# Patient Record
Sex: MALE | Race: WHITE | Employment: UNEMPLOYED | URBAN - NONMETROPOLITAN AREA
[De-identification: names, ages, dates, MRNs, and addresses within clinical notes are randomized per-mention and may not be internally consistent; named-entity substitution may affect disease eponyms.]

---

## 2022-06-02 ENCOUNTER — HOSPITAL ENCOUNTER (INPATIENT)
Age: 27
LOS: 2 days | Discharge: HOME | DRG: 753 | End: 2022-06-04
Attending: PSYCHIATRY & NEUROLOGY | Admitting: PSYCHIATRY & NEUROLOGY
Payer: MEDICAID

## 2022-06-02 PROBLEM — T14.91XA SUICIDAL BEHAVIOR WITH ATTEMPTED SELF-INJURY (HCC): Status: ACTIVE | Noted: 2022-06-02

## 2022-06-02 PROCEDURE — 6370000000 HC RX 637 (ALT 250 FOR IP): Performed by: PSYCHIATRY & NEUROLOGY

## 2022-06-02 PROCEDURE — 1240000000 HC EMOTIONAL WELLNESS R&B

## 2022-06-02 RX ORDER — POLYETHYLENE GLYCOL 3350 17 G/17G
17 POWDER, FOR SOLUTION ORAL DAILY PRN
Status: DISCONTINUED | OUTPATIENT
Start: 2022-06-02 | End: 2022-06-04 | Stop reason: HOSPADM

## 2022-06-02 RX ORDER — NICOTINE 21 MG/24HR
1 PATCH, TRANSDERMAL 24 HOURS TRANSDERMAL DAILY
Status: DISCONTINUED | OUTPATIENT
Start: 2022-06-02 | End: 2022-06-03

## 2022-06-02 RX ORDER — ACETAMINOPHEN 325 MG/1
650 TABLET ORAL EVERY 4 HOURS PRN
Status: DISCONTINUED | OUTPATIENT
Start: 2022-06-02 | End: 2022-06-04 | Stop reason: HOSPADM

## 2022-06-02 RX ADMIN — ACETAMINOPHEN 650 MG: 325 TABLET ORAL at 21:43

## 2022-06-02 ASSESSMENT — PAIN DESCRIPTION - ORIENTATION: ORIENTATION: LEFT

## 2022-06-02 ASSESSMENT — SLEEP AND FATIGUE QUESTIONNAIRES
SLEEP PATTERN: NIGHTMARES/TERRORS
DO YOU HAVE DIFFICULTY SLEEPING: YES
DO YOU USE A SLEEP AID: NO

## 2022-06-02 ASSESSMENT — PAIN DESCRIPTION - LOCATION: LOCATION: NECK

## 2022-06-02 ASSESSMENT — PAIN SCALES - GENERAL: PAINLEVEL_OUTOF10: 4

## 2022-06-02 ASSESSMENT — PAIN DESCRIPTION - DESCRIPTORS: DESCRIPTORS: SORE

## 2022-06-02 ASSESSMENT — LIFESTYLE VARIABLES: HOW OFTEN DO YOU HAVE A DRINK CONTAINING ALCOHOL: NEVER

## 2022-06-02 ASSESSMENT — PAIN - FUNCTIONAL ASSESSMENT: PAIN_FUNCTIONAL_ASSESSMENT: ACTIVITIES ARE NOT PREVENTED

## 2022-06-02 NOTE — PROGRESS NOTES
Patient arrived to floor via wheelchair with [facility] [place] and security. Transferred from [facility] by Willard Mistry. 15 minute observations rounds started. Alert and oriented x 4. Pleasant, calm and cooperative.

## 2022-06-03 VITALS
HEART RATE: 76 BPM | SYSTOLIC BLOOD PRESSURE: 122 MMHG | DIASTOLIC BLOOD PRESSURE: 67 MMHG | RESPIRATION RATE: 16 BRPM | TEMPERATURE: 97.7 F | BODY MASS INDEX: 19.02 KG/M2 | WEIGHT: 148.2 LBS | OXYGEN SATURATION: 99 % | HEIGHT: 74 IN

## 2022-06-03 PROBLEM — F39 UNSPECIFIED MOOD (AFFECTIVE) DISORDER (HCC): Status: ACTIVE | Noted: 2022-06-03

## 2022-06-03 PROCEDURE — 1240000000 HC EMOTIONAL WELLNESS R&B

## 2022-06-03 PROCEDURE — 99223 1ST HOSP IP/OBS HIGH 75: CPT | Performed by: PSYCHIATRY & NEUROLOGY

## 2022-06-03 PROCEDURE — 6370000000 HC RX 637 (ALT 250 FOR IP): Performed by: PSYCHIATRY & NEUROLOGY

## 2022-06-03 RX ORDER — HYDROXYZINE HYDROCHLORIDE 25 MG/1
25 TABLET, FILM COATED ORAL 3 TIMES DAILY PRN
Status: DISCONTINUED | OUTPATIENT
Start: 2022-06-03 | End: 2022-06-04 | Stop reason: HOSPADM

## 2022-06-03 RX ORDER — LANOLIN ALCOHOL/MO/W.PET/CERES
3 CREAM (GRAM) TOPICAL NIGHTLY PRN
Status: DISCONTINUED | OUTPATIENT
Start: 2022-06-03 | End: 2022-06-04 | Stop reason: HOSPADM

## 2022-06-03 RX ORDER — TRAZODONE HYDROCHLORIDE 50 MG/1
50 TABLET ORAL NIGHTLY PRN
Status: DISCONTINUED | OUTPATIENT
Start: 2022-06-03 | End: 2022-06-04 | Stop reason: HOSPADM

## 2022-06-03 RX ORDER — NICOTINE 21 MG/24HR
1 PATCH, TRANSDERMAL 24 HOURS TRANSDERMAL DAILY
Status: DISCONTINUED | OUTPATIENT
Start: 2022-06-04 | End: 2022-06-04 | Stop reason: HOSPADM

## 2022-06-03 RX ADMIN — ACETAMINOPHEN 650 MG: 325 TABLET ORAL at 13:45

## 2022-06-03 RX ADMIN — TRAZODONE HYDROCHLORIDE 50 MG: 50 TABLET ORAL at 20:35

## 2022-06-03 RX ADMIN — Medication 3 MG: at 20:35

## 2022-06-03 ASSESSMENT — PAIN DESCRIPTION - ORIENTATION: ORIENTATION: RIGHT

## 2022-06-03 ASSESSMENT — PAIN DESCRIPTION - DESCRIPTORS: DESCRIPTORS: ACHING;THROBBING

## 2022-06-03 ASSESSMENT — PAIN SCALES - GENERAL: PAINLEVEL_OUTOF10: 4

## 2022-06-03 ASSESSMENT — PAIN DESCRIPTION - LOCATION: LOCATION: NECK
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
## 2022-06-03 NOTE — PROGRESS NOTES
SW placed  A call to get collateral from patients girlfriend(Elena)and couldn't leave a message because the voicemail was full at [phone]

## 2022-06-03 NOTE — PROGRESS NOTES
Behavioral Services  Medicare Certification Upon Admission    I certify that this patient's inpatient psychiatric hospital admission is medically necessary for:    [x] (1) Treatment which could reasonably be expected to improve this patient's condition,       [x] (2) Or for diagnostic study;     AND     [x](2) The inpatient psychiatric services are provided while the individual is under the care of a physician and are included in the individualized plan of care.     Estimated length of stay/service 3 days    Plan for post-hospital care TBD    Electronically signed by Fidelia Whitlock MD on 6/3/2022 at 1:50 PM

## 2022-06-03 NOTE — PLAN OF CARE
Problem: Self Harm/Suicidality  Goal: Will have no self-injury during hospital stay  Description: INTERVENTIONS:  1. Q 30 MINUTES: Routine safety checks  2. Q SHIFT & PRN: Assess risk to determine if routine checks are adequate to maintain patient safety  Outcome: Progressing     Problem: Depression  Goal: Will be euthymic at discharge  Description: INTERVENTIONS:  1. Administer medication as ordered  2. Provide emotional support via 1:1 interaction with staff  3. Encourage involvement in milieu/groups/activities  4. Monitor for social isolation  Outcome: Progressing     Problem: Behavior  Goal: Pt/Family maintain appropriate behavior and adhere to behavioral management agreement, if implemented  Description: INTERVENTIONS:  1. Assess patient/family's coping skills and  non-compliant behavior (including use of illegal substances)  2. Notify security of behavior or suspected illegal substances which indicate the need for search of the patient and/or belongings  3. Encourage verbalization of thoughts and concerns in a socially appropriate manner  4. Utilize positive, consistent limit setting strategies supporting safety of patient, staff and others  5. Encourage participation in the decision making process about the behavioral management agreement  6. Implement a Health Care Agreement if patient meets criteria  7. If a patient's behavior jeopardizes the safety of the patient, staff, or others refer to organization policy. If a visitor's behavior poses a threat to safety call refer to organization policy.   8. Initiate consult with , Psychosocial CNS, Spiritual Care as appropriate  Outcome: Progressing  Flowsheets (Taken 6/3/2022 1054)  Patient/family maintains appropriate behavior and adheres to behavioral management agreement, if implemented:   Assess patient/familys coping skills and  non-compliant behavior (including use of illegal substances)   Notify security of behavior or suspected illegal substances which indicate the need for search of the patient and/or belongings   Encourage verbalization of thoughts and concerns in a socially appropriate manner   Utilize positive, consistent limit setting strategies supporting safety of patient, staff and others   Encourage participation in the decision making process about the behavioral management agreement   Implement a Health Care Agreement if patient meets criteria   If a patients behavior jeopardizes the safety of the patient, staff, or others refer to organization policy. If a visitors behavior poses a threat to safety refer to organization policy   Initiate consult with , Psychosocial Clinical Nurse Specialist, Spiritual Care as appropriate     Problem: Anxiety  Goal: Will report anxiety at manageable levels  Description: INTERVENTIONS:  1. Administer medication as ordered  2. Teach and rehearse alternative coping skills  3. Provide emotional support with 1:1 interaction with staff  Outcome: Progressing  Flowsheets (Taken 6/3/2022 1057)  Will report anxiety at manageable levels:   Administer medication as ordered   Provide emotional support with 1:1 interaction with staff   Teach and rehearse alternative coping skills     Problem: Drug Abuse/Detox  Goal: Will have no detox symptoms and will verbalize plan for changing drug-related behavior  Description: INTERVENTIONS:  1. Administer medication as ordered  2. Monitor physical status  3. Provide emotional support with 1:1 interaction with staff  4. Encourage  recovery focused treatment   Outcome: Progressing  Flowsheets (Taken 6/3/2022 1057)  Will have no detox symptoms and will verbalize plan for changing drug-related behavior:   Administer medication as ordered   Provide emotional support with 1:1 interaction with staff   Monitor physical status   Encourage recovery focused treatment     Problem: Sleep Disturbance  Goal: Will exhibit normal sleeping pattern  Description: INTERVENTIONS:  1. Administer medication as ordered  2. Decrease environmental stimuli, including noise, as appropriate  3. Discourage social isolation and naps during the day  Outcome: Progressing     Problem: Self Care Deficit  Goal: Return ADL status to a safe level of function  Description: INTERVENTIONS:  1. Administer medication as ordered  2. Assess ADL deficits and provide assistive devices as needed  3. Obtain PT/OT consults as needed  4.  Assist and instruct patient to increase activity and self care as tolerated  Outcome: Progressing

## 2022-06-03 NOTE — PROGRESS NOTES
SW met with patient to complete Psychosocial and Lifetime CSSR-S on this date. Patients long and short-term goals discussed. Patient voiced understanding. Treatment plan sheet signed. Patient verbalized understanding of the treatment plan. Patient participated in goals and objectives of the treatment plan. Patient discussed safety plan with . SW explained treatment goals with pt. Decreasing depression and anxiety by improvement of positive coping patterns was discussed. Pt acknowledged understanding of treatment goals and signed treatment plan signature sheet. In the last 6 months has the patient been a danger to self: YES  In the last 6 months has the patient been a danger to others: NO  Legal Guardian/POA: NO     Provided patient with [employer] handout entitled \"Quitting Smoking. \"  Reviewed handout with patient: addressing dangers of smoking, developing coping skills, and providing basic information about quitting. Patient received all components practical counseling of tobacco practical counseling during the hospital stay.

## 2022-06-03 NOTE — PROGRESS NOTES
Admission Note      Reason for admission/Target Symptom: Patient admitted to [facility] due to:Patient arrived to floor via wheelchair with Santana Carranza and . Transferred from [facility] by Bella Crawford. 15 minute observations rounds started. Alert and oriented x 4. Pleasant, calm and cooperative   Diagnoses: Depression NOS  UDS: Amphetamine, Methampetamine  BAL:  Neg    SW met with treatment team to discuss patient's treatment including care planning, discharge planning, and follow-up needs. Pt has been admitted to [facility]. Treatment team has identified patient's discharge needs as medication management and outpatient therapy/counseling. Pt confirmed  the need for ongoing treatment post inpatient stay. Pt was also provided a handout of contact information for drug and alcohol treatment centers and other community support service such as [facility], AA, and [facility].
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
## 2022-06-03 NOTE — PROGRESS NOTES
[facility] Adult Unit Daily Assessment  Nursing Progress Note    Room: [facility]   Name: Simone Mccann   Age: 31 y.o. Gender: male   Dx: Suicidal behavior with attempted self-injury [facility])  Precautions: suicide risk  Inpatient Status: voluntary       SLEEP:    Sleep Quality Good  Sleep Medications: Yes   PRN Sleep Meds: Yes       MEDICAL:    Other PRN Meds: No   Med Compliant: Yes  Accu-Chek: No  Oxygen/CPAP/BiPAP: No  CIWA/CINA: No   PAIN Assessment: present - adequately treated  Side Effects from medication: No    COVID Teaching:    Is Patient experiencing any respiratory symptoms (headache, fever, body aches, cough. Jesus Founds ): no  Patient educated by nursing to practice social distancing, wear masks, wash hands frequently: yes    Medical Bed:   Is patient in a medical bed? no   If medical bed is in use, has nursing secured room while patient is awake and out of the room? NA  Has safety checks by nursing been completed on the bed/room this shift? NA    Protective Factors:    Patient identifies protective factors with nursing staff as follows: Identifies reasons for living: Yes   Supportive Social Network or family: Yes    Belief that suicide is immoral/high spirituality: Yes   Responsibility to family or others/living with family: Yes   Fear of death or dying due to pain and suffering: Yes   Engaged in work or school: No     If Patient is unable to identify, reason why? PSYCH:    Depression: 4   Anxiety: 4   SI denies suicidal ideation   HI Negative for homicidal ideation      AVH:Absent      GENERAL:    Appetite: good   Percent Meals: 75%   Social: Yes   Speech: normal   Appearance: appropriately dressed, appropriately groomed and healthy looking    GROUP:    Group Participation: No  Participation Quality: None    Notes:     Pt reports sleeping fair last night. He has a good appetite. He is somewhat social with peers and staff. He has not attended groups. He is compliant with medications.   He denies SI, HI and ARACELY.    Electronically signed by Geno Mohs, RN on 6/3/22 at 3:53 PM CDT

## 2022-06-03 NOTE — PROGRESS NOTES
Group Therapy Note    Start Time:800  End Time:  722  Number of Participants: 9    Type of Group: Community Meeting       Patient's Goal:        Notes:  Patient didn't have any goals today    Participation Level:  Active Listener       Participation Quality: Appropriate      Thought Process/Content: Logical      Affective Functioning: Congruent      Mood: calm      Level of consciousness:  Alert      Modes of Intervention: Support      Discipline Responsible: Behavioral Health Tech II      Signature:  Britt Ramsey

## 2022-06-03 NOTE — PLAN OF CARE
Group Therapy Note     Date: 6/3/2022  Start Time: 1100  End Time:  1130  Number of Participants: 9     Type of Group: Psychoeducation     Wellness Binder Information  Module Name:  emotional wellness  Session Number:  5     Patient's Goal:  obstacles to emotional wellness     Notes:  pt was verbally prompted to attend group. Pt refused. Information about obstacles to wellness was provided. Status After Intervention:       Participation Level:      Participation Quality:         Speech:           Thought Process/Content:         Affective Functioning:         Mood:         Level of consciousness:          Response to Learning:         Endings:      Modes of Intervention:         Discipline Responsible: Psychoeducational Specialist        Signature:  Gladys Gilliland

## 2022-06-03 NOTE — GROUP NOTE
Group Therapy Note    Date: 6/3/2022    Group Start Time: 1600  Group End Time: 1700  Group Topic: Healthy Living/Wellness    [facility]    Joi Mendez RN              Patient's Goal:  Safety Plan        Did not attend group      Discipline Responsible: Registered Nurse      Signature:   Joi Mendez RN

## 2022-06-04 LAB
ALBUMIN SERPL-MCNC: 4 G/DL (ref 3.5–5.2)
ALP BLD-CCNC: 65 U/L (ref 40–130)
ALT SERPL-CCNC: 11 U/L (ref 5–41)
ANION GAP SERPL CALCULATED.3IONS-SCNC: 8 MMOL/L (ref 7–19)
AST SERPL-CCNC: 14 U/L (ref 5–40)
BILIRUB SERPL-MCNC: <0.2 MG/DL (ref 0.2–1.2)
BUN BLDV-MCNC: 14 MG/DL (ref 6–20)
CALCIUM SERPL-MCNC: 8.9 MG/DL (ref 8.6–10)
CHLORIDE BLD-SCNC: 106 MMOL/L (ref 98–111)
CO2: 29 MMOL/L (ref 22–29)
CREAT SERPL-MCNC: 0.8 MG/DL (ref 0.5–1.2)
GFR AFRICAN AMERICAN: >59
GFR NON-AFRICAN AMERICAN: >60
GLUCOSE BLD-MCNC: 94 MG/DL (ref 74–109)
POTASSIUM SERPL-SCNC: 4.4 MMOL/L (ref 3.5–5)
SODIUM BLD-SCNC: 143 MMOL/L (ref 136–145)
TOTAL PROTEIN: 6.5 G/DL (ref 6.6–8.7)
TSH REFLEX FT4: 1.99 UIU/ML (ref 0.35–5.5)
VITAMIN B-12: 378 PG/ML (ref 211–946)
VITAMIN D 25-HYDROXY: 24.9 NG/ML

## 2022-06-04 PROCEDURE — 82306 VITAMIN D 25 HYDROXY: CPT

## 2022-06-04 PROCEDURE — 99239 HOSP IP/OBS DSCHRG MGMT >30: CPT | Performed by: PSYCHIATRY & NEUROLOGY

## 2022-06-04 PROCEDURE — 82607 VITAMIN B-12: CPT

## 2022-06-04 PROCEDURE — 84443 ASSAY THYROID STIM HORMONE: CPT

## 2022-06-04 PROCEDURE — 80053 COMPREHEN METABOLIC PANEL: CPT

## 2022-06-04 PROCEDURE — 6370000000 HC RX 637 (ALT 250 FOR IP): Performed by: PSYCHIATRY & NEUROLOGY

## 2022-06-04 PROCEDURE — 5130000000 HC BRIDGE APPOINTMENT

## 2022-06-04 PROCEDURE — 36415 COLL VENOUS BLD VENIPUNCTURE: CPT

## 2022-06-04 NOTE — H&P
HISTORY and PHYSICAL      CHIEF COMPLAINT:  Depression    Reason for Admission:  Depression    History Obtained From:  Patient, chart    HISTORY OF PRESENT ILLNESS:      The patient is a 31 y.o. male who is admitted to the [place] [address] unit with worsening mood issues. He has no c/o CP or SOA. No abdominal pain or N/V. No dysuria. No new pain complaints. No fevers. No HA or dizziness. Past Medical History:    No past medical history on file. Past Surgical History:    No past surgical history on file. Medications Prior to Admission:    No medications prior to admission. Allergies:  Tramadol    Social History:   TOBACCO:   reports that he has been smoking cigarettes. He has been smoking about 1.00 pack per day. He has never used smokeless tobacco.  ETOH:   reports no history of alcohol use. DRUGS:   reports current drug use. Drug: Amphetamines (Speed). MARITAL STATUS:  single  OCCUPATION:  Not working  Patient currently lives with GF      Family History:   No family history on file. REVIEW OF SYSTEMS:  Constitutional: neg  CV: neg  Pulmonary: neg  GI: neg  : neg  Psych: depression  Neuro: neg  Skin: neg  MusculoSkeletal: neg  HEENT: neg  Joints: neg    Vitals:  /67   Pulse 76   Temp 97.7 °F (36.5 °C) (Temporal)   Resp 16   Ht 6' 2\" (1.88 m)   Wt 148 lb 3.2 oz (67.2 kg)   SpO2 99%   BMI 19.03 kg/m²     PHYSICAL EXAM:  Gen: NAD, alert  HEENT: WNL  Lymph: no LAD  Neck: no JVD or masses  Chest: CTA bilat  CV: RRR  Abdomen: NT/ND  Extrem: no C/C/E  Neuro: non focal  Skin: no rashes  Joints: no redness    DATA:  I have reviewed the admission labs and imaging tests.     ASSESSMENT AND PLAN:      Principal Problem:    Suicidal behavior with attempted self-injury, Depression---follow with Mallorie Bills MD  6:28 AM 6/4/2022

## 2022-06-04 NOTE — PROGRESS NOTES
Behavioral Health   Discharge Note  Bridge Appointment completed: Reviewed Discharge Instructions with patient. Patient verbalizes understanding and agreement with the discharge plan using the teachback method. Referral for Outpatient Tobacco Cessation Counseling, upon discharge (zhang X if applicable and completed):    ( )  Hospital staff assisted patient to call Quit Line or faxed referral                                   during hospitalization                  ( )  Recognizing danger situations (included triggers and roadblocks), if not completed on admission                    ( )  Coping skills (new ways to manage stress, exercise, relaxation techniques, changing routine, distraction), if not completed on admission                                                           ( )  Basic information about quitting (benefits of quitting, techniques in how to quit, available resources, if not completed on admission  ( ) Referral for counseling faxed to [facility]   ( ) Patient refused referral  ( ) Patient refused counseling  (x ) Patient refused smoking cessation medication upon discharge    Vaccinations (zhang X if applicable and completed):  ( ) Patient states already received influenza vaccine elsewhere  ( ) Patient received influenza vaccine during this hospitalization  ( ) Patient refused influenza vaccine at this time      Pt discharged with followings belongings:         Valuables retrieved from safe and returned to patient. Patient left department with staff   via ambulatory to private car with mother  , discharged to home  . Patient education on aftercare instructions: yes  Patient verbalize understanding of AVS:  yes. Suicidal Ideations? No AVH? denies HI?  Negative for homicidal ideation       Status EXAM upon discharge:  Mental Status and Behavioral Exam  Normal: Yes  Level of Assistance: Independent/Self  Facial Expression: Avoids Gaze  Affect: Blunt  Level of Consciousness: Alert  Frequency of Checks: 4 times per hour, close  Mood:Normal: No  Mood: Labile,Anxious  Motor Activity:Normal: No  Motor Activity: Decreased  Eye Contact: Fair  Observed Behavior: Guarded  Sexual Misconduct History: Current - no  Involved In Any Sexual Misconduct With Others? : No  History of Sexually Inappropriate Behavior When Previously Hospitalized?: No  Uncontrollable/Compulsive Masturbation?: No  Difficulty Controlling Sexual Impulses?: No  Preception: [place] to person,[place] to place,[place] to time,[place] to situation  Attention:Normal: Yes  Attention: Distractible  Thought Processes: Circumstantial  Thought Content:Normal: Yes  Depression Symptoms: No problems reported or observed. Anxiety Symptoms: No problems reported or observed. Rose Symptoms: No problems reported or observed. Hallucinations: None  Delusions: No  Memory:Normal: Yes  Insight and Judgment: No  Insight and Judgment: Poor judgment,Poor insight    AVS/Transition Record has been discussed with patient and a copy was given to the patient. The AVS/Transition Record was faxed to the next level of care today.
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
## 2022-06-04 NOTE — PROGRESS NOTES
Group Note    Number of Participants in Group: 10  Number of Patients on Unit:10      Patient attended group:Yes  Reason for Absence:  Intervention for patient absence:        Type of Group:   Wrap-Up/Relaxation    Patient's Goal: See wrap up group sheet    Participation Level:    active           Patient Response to Learning: Yes    Patient's Behavior: Pleasant    Is Patient Social/Interacting: Yes    Relaxation:   Television:Yes   Reading:Yes   Game/Puzzle:No   Phone: No       Notes/Comments:      Please see patient's wrap up group sheet for patient's comments       Electronically signed by Francheska Luciano on 6/3/22 at 8:14 PM CDT

## 2022-06-04 NOTE — PLAN OF CARE
Problem: Self Harm/Suicidality  Goal: Will have no self-injury during hospital stay  Description: INTERVENTIONS:  1. Q 30 MINUTES: Routine safety checks  2. Q SHIFT & PRN: Assess risk to determine if routine checks are adequate to maintain patient safety  Outcome: Completed     Problem: Depression  Goal: Will be euthymic at discharge  Description: INTERVENTIONS:  1. Administer medication as ordered  2. Provide emotional support via 1:1 interaction with staff  3. Encourage involvement in milieu/groups/activities  4. Monitor for social isolation  Outcome: Completed     Problem: Behavior  Goal: Pt/Family maintain appropriate behavior and adhere to behavioral management agreement, if implemented  Description: INTERVENTIONS:  1. Assess patient/family's coping skills and  non-compliant behavior (including use of illegal substances)  2. Notify security of behavior or suspected illegal substances which indicate the need for search of the patient and/or belongings  3. Encourage verbalization of thoughts and concerns in a socially appropriate manner  4. Utilize positive, consistent limit setting strategies supporting safety of patient, staff and others  5. Encourage participation in the decision making process about the behavioral management agreement  6. Implement a Health Care Agreement if patient meets criteria  7. If a patient's behavior jeopardizes the safety of the patient, staff, or others refer to organization policy. If a visitor's behavior poses a threat to safety call refer to organization policy. 8. Initiate consult with , Psychosocial CNS, Spiritual Care as appropriate  Outcome: Completed     Problem: Anxiety  Goal: Will report anxiety at manageable levels  Description: INTERVENTIONS:  1. Administer medication as ordered  2. Teach and rehearse alternative coping skills  3.  Provide emotional support with 1:1 interaction with staff  Outcome: Completed     Problem: Drug Abuse/Detox  Goal: Will have no detox symptoms and will verbalize plan for changing drug-related behavior  Description: INTERVENTIONS:  1. Administer medication as ordered  2. Monitor physical status  3. Provide emotional support with 1:1 interaction with staff  4. Encourage  recovery focused treatment   Outcome: Completed     Problem: Sleep Disturbance  Goal: Will exhibit normal sleeping pattern  Description: INTERVENTIONS:  1. Administer medication as ordered  2. Decrease environmental stimuli, including noise, as appropriate  3. Discourage social isolation and naps during the day  Outcome: Completed     Problem: Self Care Deficit  Goal: Return ADL status to a safe level of function  Description: INTERVENTIONS:  1. Administer medication as ordered  2. Assess ADL deficits and provide assistive devices as needed  3. Obtain PT/OT consults as needed  4.  Assist and instruct patient to increase activity and self care as tolerated  Outcome: Completed
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
## 2022-06-08 NOTE — PROGRESS NOTES
Discharge Note     Pt discharging on this date. Pt denies SI, HI, and AVH at this time. Pt reports improvement in behavior and is leaving unit in overall good condition. SW and pt discussed pt's follow up appointments and importance of attending appointments as scheduled, pt voiced understanding and agreement. Pt and SW also discussed pt safety plan and pt able to verbally identify: warning signs, coping strategies, places and people that help make the pt feel better/distract negative thoughts, friends/family/agencies/professionals the pt can reach out to in a crisis, and something that is important to the pt/worth living for. Pt provided the national suicide prevention hotline number (7-283-021-958-584-5127) as well as local community behavioral health [facility]) crisis number for emergencies (0-572-434-926.979.5977). Pt to follow up with:  Mervin in 1 week for intake, therapy, and medication management appointment. PCP in 2 weeks. Referral to out patient tobacco cessation counseling treatment:  Made at discharge with (company) on (date) at (time)  Patient refused referral to outpatient tobacco cessation counseling    SW offered to assist pt with transportation, pt had his own transportation lined up.
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
